# Patient Record
Sex: FEMALE | Race: BLACK OR AFRICAN AMERICAN | Employment: UNEMPLOYED | URBAN - METROPOLITAN AREA
[De-identification: names, ages, dates, MRNs, and addresses within clinical notes are randomized per-mention and may not be internally consistent; named-entity substitution may affect disease eponyms.]

---

## 2017-07-04 ENCOUNTER — HOSPITAL ENCOUNTER (EMERGENCY)
Age: 62
Discharge: HOME | End: 2017-07-04
Attending: EMERGENCY MEDICINE
Payer: COMMERCIAL

## 2017-07-04 ENCOUNTER — APPOINTMENT (OUTPATIENT)
Dept: GENERAL RADIOLOGY | Age: 62
End: 2017-07-04
Attending: EMERGENCY MEDICINE
Payer: COMMERCIAL

## 2017-07-04 VITALS
SYSTOLIC BLOOD PRESSURE: 177 MMHG | WEIGHT: 165 LBS | HEIGHT: 63 IN | RESPIRATION RATE: 16 BRPM | OXYGEN SATURATION: 100 % | DIASTOLIC BLOOD PRESSURE: 81 MMHG | BODY MASS INDEX: 29.23 KG/M2 | HEART RATE: 104 BPM | TEMPERATURE: 98.5 F

## 2017-07-04 DIAGNOSIS — S33.2XXA: Primary | ICD-10-CM

## 2017-07-04 PROCEDURE — 74011250637 HC RX REV CODE- 250/637: Performed by: EMERGENCY MEDICINE

## 2017-07-04 PROCEDURE — 99283 EMERGENCY DEPT VISIT LOW MDM: CPT | Performed by: EMERGENCY MEDICINE

## 2017-07-04 PROCEDURE — 72220 X-RAY EXAM SACRUM TAILBONE: CPT

## 2017-07-04 RX ORDER — TRAMADOL HYDROCHLORIDE 50 MG/1
50 TABLET ORAL
Status: COMPLETED | OUTPATIENT
Start: 2017-07-04 | End: 2017-07-04

## 2017-07-04 RX ORDER — TRAMADOL HYDROCHLORIDE 50 MG/1
50-100 TABLET ORAL
Qty: 19 TAB | Refills: 0 | Status: SHIPPED | OUTPATIENT
Start: 2017-07-04

## 2017-07-04 RX ADMIN — TRAMADOL HYDROCHLORIDE 50 MG: 50 TABLET, FILM COATED ORAL at 01:38

## 2017-07-04 NOTE — ED PROVIDER NOTES
Patient is a 57 y.o. female presenting with coccyx pain. The history is provided by the patient and the spouse. Tailbone Pain    This is a new problem. The current episode started 3 to 5 hours ago. The problem has been gradually worsening. The problem occurs constantly. Patient reports not work related injury. The pain is associated with a fall. The pain is present in the sacro-iliac joint. The quality of the pain is described as aching and dull. The pain does not radiate. The pain is moderate. The symptoms are aggravated by bending and certain positions. The pain is the same all the time. Pertinent negatives include no chest pain, no fever, no headaches, no abdominal pain, no bowel incontinence, no dysuria, no pelvic pain, no leg pain, no paresthesias, no paresis, no tingling and no weakness. She has tried nothing for the symptoms. Risk factors include obesity. The patient's surgical history non-contributory        Past Medical History:   Diagnosis Date    Dementia     Hypertension     Ill-defined condition     high cholerterol    Psychiatric disorder     anxiety       No past surgical history on file. No family history on file. Social History     Social History    Marital status:      Spouse name: N/A    Number of children: N/A    Years of education: N/A     Occupational History    Not on file. Social History Main Topics    Smoking status: Current Some Day Smoker    Smokeless tobacco: Not on file    Alcohol use No    Drug use: Not on file    Sexual activity: Not on file     Other Topics Concern    Not on file     Social History Narrative    No narrative on file         ALLERGIES: Review of patient's allergies indicates no known allergies. Review of Systems   Constitutional: Negative for chills and fever. HENT: Negative for congestion, ear pain and rhinorrhea. Eyes: Negative for photophobia and discharge. Respiratory: Negative for cough and shortness of breath. Cardiovascular: Negative for chest pain and palpitations. Gastrointestinal: Negative for abdominal pain, bowel incontinence, constipation, diarrhea and vomiting. Endocrine: Negative for cold intolerance and heat intolerance. Genitourinary: Negative for dysuria, flank pain and pelvic pain. Musculoskeletal: Positive for arthralgias and back pain. Negative for myalgias and neck pain. Skin: Negative for rash and wound. Allergic/Immunologic: Negative for environmental allergies and food allergies. Neurological: Negative for tingling, syncope, weakness, headaches and paresthesias. Hematological: Negative for adenopathy. Does not bruise/bleed easily. Psychiatric/Behavioral: Negative for dysphoric mood. The patient is not nervous/anxious. All other systems reviewed and are negative. Vitals:    07/04/17 0025 07/04/17 0144   BP: (!) 195/93 177/81   Pulse: 90 (!) 104   Resp: 20 16   Temp: 98.3 °F (36.8 °C) 98.5 °F (36.9 °C)   SpO2: 99% 100%   Weight: 74.8 kg (165 lb)    Height: 5' 3\" (1.6 m)             Physical Exam   Constitutional: She is oriented to person, place, and time. She appears well-developed and well-nourished. She appears distressed. HENT:   Head: Normocephalic and atraumatic. Mouth/Throat: Oropharynx is clear and moist.   Eyes: Conjunctivae and EOM are normal. Pupils are equal, round, and reactive to light. Right eye exhibits no discharge. Left eye exhibits no discharge. No scleral icterus. Neck: Normal range of motion. Neck supple. No thyromegaly present. Cardiovascular: Normal rate, regular rhythm, normal heart sounds and intact distal pulses. Exam reveals no gallop and no friction rub. No murmur heard. Pulmonary/Chest: Effort normal and breath sounds normal. No respiratory distress. She has no wheezes. She exhibits no tenderness. Abdominal: Soft. Bowel sounds are normal. She exhibits no distension. There is no hepatosplenomegaly. There is no tenderness.  There is no rebound and no guarding. No hernia. Musculoskeletal: Normal range of motion. She exhibits no edema or tenderness. Back:    Neurological: She is alert and oriented to person, place, and time. No cranial nerve deficit. She exhibits normal muscle tone. Skin: Skin is warm. No rash noted. She is not diaphoretic. No erythema. Psychiatric: She has a normal mood and affect. Her behavior is normal. Judgment and thought content normal.   Nursing note and vitals reviewed. MDM  Number of Diagnoses or Management Options  Subluxation of coccyx, initial encounter: new and requires workup  Diagnosis management comments: X-ray does not reveal any acute fracture. Evidence of subluxation is noted by the radiologist, however. Amount and/or Complexity of Data Reviewed  Tests in the radiology section of CPT®: ordered and reviewed  Tests in the medicine section of CPT®: ordered and reviewed  Review and summarize past medical records: yes    Risk of Complications, Morbidity, and/or Mortality  Presenting problems: moderate  Diagnostic procedures: moderate  Management options: moderate  General comments: Elements of this note have been dictated via voice recognition software. Text and phrases may be limited by the accuracy of the software. The chart has been reviewed, but errors may still be present.       Patient Progress  Patient progress: improved    ED Course       Procedures

## 2020-10-19 ENCOUNTER — HOSPITAL ENCOUNTER (EMERGENCY)
Age: 65
Discharge: HOME | End: 2020-10-19
Attending: EMERGENCY MEDICINE
Payer: COMMERCIAL

## 2020-10-19 VITALS
TEMPERATURE: 98.5 F | HEIGHT: 63 IN | OXYGEN SATURATION: 100 % | HEART RATE: 78 BPM | DIASTOLIC BLOOD PRESSURE: 84 MMHG | WEIGHT: 155 LBS | RESPIRATION RATE: 18 BRPM | BODY MASS INDEX: 27.46 KG/M2 | SYSTOLIC BLOOD PRESSURE: 175 MMHG

## 2020-10-19 PROCEDURE — 75810000275 HC EMERGENCY DEPT VISIT NO LEVEL OF CARE

## 2020-10-19 RX ORDER — BENAZEPRIL HYDROCHLORIDE 10 MG/1
10 TABLET ORAL DAILY
COMMUNITY

## 2020-10-19 NOTE — ED TRIAGE NOTES
Patient complain of hemorrhoids and she said that it is hurting too much [place].  Patient was masked on arrival.

## 2023-06-15 ENCOUNTER — APPOINTMENT (OUTPATIENT)
Dept: CT IMAGING | Age: 68
End: 2023-06-15
Attending: INTERNAL MEDICINE
Payer: MEDICARE

## 2023-06-15 ENCOUNTER — HOSPITAL ENCOUNTER (OUTPATIENT)
Age: 68
Setting detail: OUTPATIENT SURGERY
Discharge: HOME | End: 2023-06-15
Attending: INTERNAL MEDICINE
Payer: MEDICARE

## 2023-06-15 VITALS
HEART RATE: 104 BPM | TEMPERATURE: 98.3 F | OXYGEN SATURATION: 100 % | SYSTOLIC BLOOD PRESSURE: 114 MMHG | RESPIRATION RATE: 16 BRPM | DIASTOLIC BLOOD PRESSURE: 55 MMHG

## 2023-06-15 PROBLEM — J90 PLEURAL EFFUSION ON RIGHT: Status: ACTIVE | Noted: 2023-06-15

## 2023-06-15 PROBLEM — I10 HTN (HYPERTENSION): Chronic | Status: ACTIVE | Noted: 2023-06-15

## 2023-06-15 PROBLEM — E11.51 DIABETES TYPE 2 WITH ATHEROSCLEROSIS OF ARTERIES OF EXTREMITIES (HCC): Chronic | Status: ACTIVE | Noted: 2023-06-15

## 2023-06-15 PROBLEM — E11.9 DIABETES (HCC): Status: ACTIVE | Noted: 2023-06-15

## 2023-06-15 PROBLEM — I70.209 DIABETES TYPE 2 WITH ATHEROSCLEROSIS OF ARTERIES OF EXTREMITIES (HCC): Chronic | Status: ACTIVE | Noted: 2023-06-15

## 2023-06-15 PROBLEM — R91.8 MASS OF LEFT LUNG: Status: ACTIVE | Noted: 2023-06-15

## 2023-06-15 PROBLEM — Z72.0 TOBACCO USE: Chronic | Status: ACTIVE | Noted: 2023-06-15

## 2023-06-15 PROBLEM — Z85.3 HX: BREAST CANCER: Chronic | Status: ACTIVE | Noted: 2023-06-15

## 2023-06-15 PROBLEM — Z85.42 HISTORY OF ENDOMETRIAL CANCER: Chronic | Status: ACTIVE | Noted: 2023-06-15

## 2023-06-15 PROBLEM — E78.00 HYPERCHOLESTEROLEMIA: Status: ACTIVE | Noted: 2023-06-15

## 2023-06-15 LAB
APPEARANCE FLD: NORMAL
COLOR FLD: YELLOW
GLUCOSE FLD-MCNC: 392 MG/DL
LDH FLD L TO P-CCNC: 339 U/L
LYMPHOCYTES NFR BRONCH MANUAL: 92 %
NEUTROPHILS NFR BRONCH MANUAL: 8 %
NUC CELL # FLD: 294 /CU MM
PROT FLD-MCNC: 5.5 G/DL
RBC # FLD: 1000 /CU MM
SPECIMEN SOURCE FLD: NORMAL

## 2023-06-15 PROCEDURE — 84157 ASSAY OF PROTEIN OTHER: CPT

## 2023-06-15 PROCEDURE — 87116 MYCOBACTERIA CULTURE: CPT

## 2023-06-15 PROCEDURE — 87102 FUNGUS ISOLATION CULTURE: CPT

## 2023-06-15 PROCEDURE — 99214 OFFICE O/P EST MOD 30 MIN: CPT | Performed by: NURSE PRACTITIONER

## 2023-06-15 PROCEDURE — 87205 SMEAR GRAM STAIN: CPT

## 2023-06-15 PROCEDURE — C1729 CATH, DRAINAGE: HCPCS | Performed by: INTERNAL MEDICINE

## 2023-06-15 PROCEDURE — 32554 ASPIRATE PLEURA W/O IMAGING: CPT | Performed by: INTERNAL MEDICINE

## 2023-06-15 PROCEDURE — 71250 CT THORAX DX C-: CPT

## 2023-06-15 PROCEDURE — 84311 SPECTROPHOTOMETRY: CPT

## 2023-06-15 PROCEDURE — 2709999900 HC NON-CHARGEABLE SUPPLY: Performed by: INTERNAL MEDICINE

## 2023-06-15 PROCEDURE — 88305 TISSUE EXAM BY PATHOLOGIST: CPT

## 2023-06-15 PROCEDURE — 87070 CULTURE OTHR SPECIMN AEROBIC: CPT

## 2023-06-15 PROCEDURE — 89050 BODY FLUID CELL COUNT: CPT

## 2023-06-15 PROCEDURE — 32555 ASPIRATE PLEURA W/ IMAGING: CPT | Performed by: INTERNAL MEDICINE

## 2023-06-15 PROCEDURE — 87206 SMEAR FLUORESCENT/ACID STAI: CPT

## 2023-06-15 PROCEDURE — 88112 CYTOPATH CELL ENHANCE TECH: CPT

## 2023-06-15 PROCEDURE — 83615 LACTATE (LD) (LDH) ENZYME: CPT

## 2023-06-15 PROCEDURE — 82945 GLUCOSE OTHER FLUID: CPT

## 2023-06-15 ASSESSMENT — PAIN - FUNCTIONAL ASSESSMENT: PAIN_FUNCTIONAL_ASSESSMENT: NONE - DENIES PAIN

## 2023-06-17 LAB
BACTERIA SPEC CULT: NORMAL
GRAM STN SPEC: NORMAL
GRAM STN SPEC: NORMAL
SERVICE CMNT-IMP: NORMAL

## 2023-06-20 LAB
ADENOSINE DEAMINASE PLR-CCNC: 13 U/L (ref 0–30)
FUNGAL CULT/SMEAR: NORMAL
SPECIMEN PROCESSING: NORMAL
SPECIMEN SOURCE: NORMAL
SPECIMEN SOURCE: NORMAL

## 2023-06-21 LAB
FUNGUS SMEAR: NORMAL
SPECIMEN SOURCE: NORMAL

## 2023-06-26 LAB
ACID FAST STN SPEC: NEGATIVE
MYCOBACTERIUM SPEC QL CULT: NORMAL
SPECIMEN PREPARATION: NORMAL
SPECIMEN SOURCE: NORMAL

## 2023-07-05 ENCOUNTER — TELEPHONE (OUTPATIENT)
Dept: PULMONOLOGY | Age: 68
End: 2023-07-05

## 2023-07-07 ENCOUNTER — TELEPHONE (OUTPATIENT)
Dept: PULMONOLOGY | Age: 68
End: 2023-07-07

## 2023-07-17 LAB
FUNGAL CULT/SMEAR: NORMAL
FUNGUS (MYCOLOGY) CULTURE: NORMAL
FUNGUS SMEAR: NORMAL
REFLEX TO ID: NORMAL
SPECIMEN PROCESSING: NORMAL
SPECIMEN SOURCE: NORMAL
SPECIMEN SOURCE: NORMAL

## 2023-07-20 NOTE — TELEPHONE ENCOUNTER
Spoke directly to Dr. Stalin DOMINGO and he said patient needs to go back to [facility] due to previous cancer history and the pleural fluid did show Rare Malignancy Cells since he was only consulted for procedure and never seen her in the office. Spoke to patient and notified of results and she voices understanding.

## 2023-07-29 ENCOUNTER — HOSPITAL ENCOUNTER (EMERGENCY)
Age: 68
Discharge: HOME | End: 2023-07-29
Attending: EMERGENCY MEDICINE
Payer: MEDICARE

## 2023-07-29 ENCOUNTER — APPOINTMENT (OUTPATIENT)
Dept: GENERAL RADIOLOGY | Age: 68
End: 2023-07-29
Payer: MEDICARE

## 2023-07-29 VITALS
BODY MASS INDEX: 22.2 KG/M2 | DIASTOLIC BLOOD PRESSURE: 68 MMHG | RESPIRATION RATE: 20 BRPM | WEIGHT: 130 LBS | HEIGHT: 64 IN | HEART RATE: 110 BPM | OXYGEN SATURATION: 95 % | SYSTOLIC BLOOD PRESSURE: 116 MMHG | TEMPERATURE: 98.5 F

## 2023-07-29 DIAGNOSIS — M25.511 ACUTE PAIN OF RIGHT SHOULDER: Primary | ICD-10-CM

## 2023-07-29 PROCEDURE — 99283 EMERGENCY DEPT VISIT LOW MDM: CPT

## 2023-07-29 PROCEDURE — 73030 X-RAY EXAM OF SHOULDER: CPT

## 2023-07-29 PROCEDURE — 6370000000 HC RX 637 (ALT 250 FOR IP): Performed by: EMERGENCY MEDICINE

## 2023-07-29 RX ORDER — PREDNISONE 20 MG/1
40 TABLET ORAL ONCE
Status: COMPLETED | OUTPATIENT
Start: 2023-07-29 | End: 2023-07-29

## 2023-07-29 RX ORDER — PREDNISONE 20 MG/1
40 TABLET ORAL DAILY
Qty: 8 TABLET | Refills: 0 | Status: SHIPPED | OUTPATIENT
Start: 2023-07-30 | End: 2023-08-03

## 2023-07-29 RX ADMIN — PREDNISONE 40 MG: 20 TABLET ORAL at 20:17

## 2023-07-29 ASSESSMENT — PAIN DESCRIPTION - DESCRIPTORS: DESCRIPTORS: ACHING

## 2023-07-29 ASSESSMENT — PAIN DESCRIPTION - LOCATION: LOCATION: SHOULDER

## 2023-07-29 ASSESSMENT — LIFESTYLE VARIABLES: HOW MANY STANDARD DRINKS CONTAINING ALCOHOL DO YOU HAVE ON A TYPICAL DAY: PATIENT DOES NOT DRINK

## 2023-07-29 ASSESSMENT — PAIN SCALES - GENERAL: PAINLEVEL_OUTOF10: 6

## 2023-07-29 ASSESSMENT — PAIN - FUNCTIONAL ASSESSMENT: PAIN_FUNCTIONAL_ASSESSMENT: 0-10

## 2023-07-29 ASSESSMENT — PAIN DESCRIPTION - ORIENTATION: ORIENTATION: RIGHT

## 2023-07-30 NOTE — ED PROVIDER NOTES
Emergency Department Provider Note       PCP: Daniel Brar MD   Age: 77 y.o. Sex: female     DISPOSITION       No diagnosis found. Medical Decision Making     Complexity of Problems Addressed:  1 or more acute illnesses that pose a threat to life or bodily function. Data Reviewed and Analyzed:  Category 1:   I independently ordered and reviewed each unique test.  I reviewed external records: provider visit note from outside specialist.       Category 2:   I interpreted the X-rays no acute abnormality of the right shoulder. There is a residual right-sided pleural effusion which patient has had for a couple months. .    Category 3: Discussion of management or test interpretation. 75-year-old -American female presents with nontraumatic right shoulder pain. Pain is aggravated by movement. It is keeping her up at night. She knows of no injury. No fever. Of note she was recently diagnosed with suspected metastatic lung disease with right-sided pleural effusion requiring thoracentesis. She has been referred to oncology and has an appointment next week. No chest pain or shortness of breath at this time. No abdominal pain. Physical exam  Well-nourished -American female awake alert no distress. HEENT exam normocephalic atraumatic neck has no JVD. Cardiovascular regular rate and rhythm. Lungs mildly diminished on the right. No wheezing. Abdomen is soft nontender. Extremities no bruising swelling or deformity to right shoulder. There is some tenderness to the posterior aspect. Good range of motion and good distal function. ED course  X-ray shows no acute abnormality of the shoulder. There is a residual pleural effusion on the right. Patient is aware of the pleural effusion and has oncology follow-up scheduled. She was treated with prednisone in the emergency department. Will discharge home with prednisone for shoulder pain.       Risk of Complications and/or Morbidity of DOSEPACK) 4 MG TABLET    follow package directions    MOMETASONE (NASONEX) 50 MCG/ACT NASAL SPRAY    2 sprays by Nasal route daily    RANITIDINE (ZANTAC) 150 MG CAPSULE    Take 1 capsule by mouth    ROSUVASTATIN (CRESTOR) 5 MG TABLET    Take 1 tablet by mouth        Results for orders placed or performed during the hospital encounter of 07/29/23   XR SHOULDER RIGHT (MIN 2 VIEWS)    Narrative    EXAM: Right shoulder radiographs    DATE: July 29, 2023    HISTORY: Right shoulder pain    COMPARISON: None available    TECHNIQUE: 3 radiographs are obtained of the right shoulder    FINDINGS:    The osseous structures appear demineralized. The humeral head is well-seated   within the bony glenoid. There are mild degenerative changes in the right   shoulder. There is no acute fracture or dislocation. The right AC joint is   intact. Incidental note is made of a moderate or moderate to large right-sided   pleural effusion. This effusion could be loculated in the periphery of the right   apex. The mediastinum appears widened. Impression    1. No acute osseous abnormality. 2. Incidental note is made of a moderate to large right-sided pleural effusion,   possibly loculated. The need for a follow-up CT study of the thorax with   contrast for better characterization can be determined clinically. 3. The mediastinum appears widened. Again, a follow-up CT thorax with contrast   should be considered for better characterization. Cresencio Polanco M.D.   7/29/2023 8:11:00 PM        XR SHOULDER RIGHT (MIN 2 VIEWS)   Final Result   1. No acute osseous abnormality. 2. Incidental note is made of a moderate to large right-sided pleural effusion,    possibly loculated. The need for a follow-up CT study of the thorax with    contrast for better characterization can be determined clinically. 3. The mediastinum appears widened. Again, a follow-up CT thorax with contrast    should be considered for better characterization.        Jazzy Banks

## 2023-08-10 LAB
ACID FAST STN SPEC: NEGATIVE
MYCOBACTERIUM SPEC QL CULT: NEGATIVE
SPECIMEN PREPARATION: NORMAL
SPECIMEN SOURCE: NORMAL

## 2024-03-12 ENCOUNTER — APPOINTMENT (OUTPATIENT)
Dept: GENERAL RADIOLOGY | Age: 69
End: 2024-03-12
Payer: MEDICARE

## 2024-03-12 ENCOUNTER — HOSPITAL ENCOUNTER (EMERGENCY)
Age: 69
Discharge: ANOTHER HOSPITAL | End: 2024-03-12
Attending: EMERGENCY MEDICINE
Payer: MEDICARE

## 2024-03-12 ENCOUNTER — APPOINTMENT (OUTPATIENT)
Dept: ULTRASOUND IMAGING | Age: 69
End: 2024-03-12
Payer: MEDICARE

## 2024-03-12 ENCOUNTER — HOSPITAL ENCOUNTER (INPATIENT)
Age: 69
LOS: 1 days | Discharge: HOSPICE | DRG: 180 | End: 2024-03-13
Attending: STUDENT IN AN ORGANIZED HEALTH CARE EDUCATION/TRAINING PROGRAM | Admitting: FAMILY MEDICINE
Payer: MEDICARE

## 2024-03-12 ENCOUNTER — APPOINTMENT (OUTPATIENT)
Dept: CT IMAGING | Age: 69
End: 2024-03-12
Payer: MEDICARE

## 2024-03-12 VITALS
WEIGHT: 98 LBS | RESPIRATION RATE: 29 BRPM | BODY MASS INDEX: 16.73 KG/M2 | HEIGHT: 64 IN | TEMPERATURE: 98.4 F | HEART RATE: 148 BPM | DIASTOLIC BLOOD PRESSURE: 100 MMHG | SYSTOLIC BLOOD PRESSURE: 126 MMHG | OXYGEN SATURATION: 100 %

## 2024-03-12 DIAGNOSIS — J91.0 MALIGNANT PLEURAL EFFUSION: Primary | ICD-10-CM

## 2024-03-12 DIAGNOSIS — R06.89 DYSPNEA AND RESPIRATORY ABNORMALITIES: ICD-10-CM

## 2024-03-12 DIAGNOSIS — M84.522A PATHOLOGICAL FRACTURE OF LEFT HUMERUS DUE TO NEOPLASTIC DISEASE, INITIAL ENCOUNTER: ICD-10-CM

## 2024-03-12 DIAGNOSIS — R06.00 DYSPNEA AND RESPIRATORY ABNORMALITIES: ICD-10-CM

## 2024-03-12 DIAGNOSIS — R06.02 SHORTNESS OF BREATH: ICD-10-CM

## 2024-03-12 DIAGNOSIS — C54.1 MALIGNANT NEOPLASM OF ENDOMETRIUM (HCC): ICD-10-CM

## 2024-03-12 PROBLEM — C79.51 PAIN FROM BONE METASTASES (HCC): Status: ACTIVE | Noted: 2024-02-21

## 2024-03-12 PROBLEM — M22.42 CHONDROMALACIA OF LEFT PATELLA: Status: ACTIVE | Noted: 2017-10-04

## 2024-03-12 PROBLEM — G89.3 PAIN FROM BONE METASTASES (HCC): Status: ACTIVE | Noted: 2024-01-01

## 2024-03-12 PROBLEM — R00.0 SINUS TACHYCARDIA: Status: ACTIVE | Noted: 2024-03-12

## 2024-03-12 PROBLEM — D05.11 DUCTAL CARCINOMA IN SITU (DCIS) OF RIGHT BREAST: Chronic | Status: ACTIVE | Noted: 2019-03-20

## 2024-03-12 PROBLEM — I24.89 DEMAND ISCHEMIA (HCC): Status: ACTIVE | Noted: 2024-01-01

## 2024-03-12 PROBLEM — R91.8 LUNG NODULES: Status: ACTIVE | Noted: 2023-08-03

## 2024-03-12 PROBLEM — E86.0 DEHYDRATION: Status: ACTIVE | Noted: 2024-03-12

## 2024-03-12 LAB
ALBUMIN SERPL-MCNC: 3 G/DL (ref 3.2–4.6)
ALBUMIN/GLOB SERPL: 0.6 (ref 0.4–1.6)
ALP SERPL-CCNC: 131 U/L (ref 50–130)
ALT SERPL-CCNC: 14 U/L (ref 12–65)
ANION GAP SERPL CALC-SCNC: 8 MMOL/L (ref 2–11)
APPEARANCE UR: CLEAR
AST SERPL-CCNC: 48 U/L (ref 15–37)
BACTERIA URNS QL MICRO: NEGATIVE /HPF
BASOPHILS # BLD: 0 K/UL (ref 0–0.2)
BASOPHILS NFR BLD: 1 % (ref 0–2)
BILIRUB SERPL-MCNC: 0.6 MG/DL (ref 0.2–1.1)
BILIRUB UR QL: NEGATIVE
BUN SERPL-MCNC: 13 MG/DL (ref 8–23)
CALCIUM SERPL-MCNC: 9.9 MG/DL (ref 8.3–10.4)
CASTS URNS QL MICRO: ABNORMAL /LPF
CHLORIDE SERPL-SCNC: 95 MMOL/L (ref 103–113)
CO2 SERPL-SCNC: 35 MMOL/L (ref 21–32)
COLOR UR: ABNORMAL
CREAT SERPL-MCNC: 0.47 MG/DL (ref 0.6–1)
CRP SERPL-MCNC: 4.2 MG/DL (ref 0–0.9)
DIFFERENTIAL METHOD BLD: ABNORMAL
EKG ATRIAL RATE: 140 BPM
EKG DIAGNOSIS: NORMAL
EKG P AXIS: 31 DEGREES
EKG P-R INTERVAL: 96 MS
EKG Q-T INTERVAL: 350 MS
EKG QRS DURATION: 106 MS
EKG QTC CALCULATION (BAZETT): 535 MS
EKG R AXIS: -37 DEGREES
EKG T AXIS: 191 DEGREES
EKG VENTRICULAR RATE: 140 BPM
EOSINOPHIL # BLD: 0 K/UL (ref 0–0.8)
EOSINOPHIL NFR BLD: 0 % (ref 0.5–7.8)
EPI CELLS #/AREA URNS HPF: ABNORMAL /HPF
ERYTHROCYTE [DISTWIDTH] IN BLOOD BY AUTOMATED COUNT: 18.6 % (ref 11.9–14.6)
FLUAV RNA SPEC QL NAA+PROBE: NOT DETECTED
FLUBV RNA SPEC QL NAA+PROBE: NOT DETECTED
GLOBULIN SER CALC-MCNC: 4.9 G/DL (ref 2.8–4.5)
GLUCOSE SERPL-MCNC: 137 MG/DL (ref 65–100)
GLUCOSE UR STRIP.AUTO-MCNC: NEGATIVE MG/DL
HCT VFR BLD AUTO: 42.4 % (ref 35.8–46.3)
HGB BLD-MCNC: 13.3 G/DL (ref 11.7–15.4)
HGB UR QL STRIP: ABNORMAL
IMM GRANULOCYTES # BLD AUTO: 0 K/UL (ref 0–0.5)
IMM GRANULOCYTES NFR BLD AUTO: 0 % (ref 0–5)
KETONES UR QL STRIP.AUTO: 15 MG/DL
LACTATE SERPL-SCNC: 2 MMOL/L (ref 0.4–2)
LEUKOCYTE ESTERASE UR QL STRIP.AUTO: ABNORMAL
LYMPHOCYTES # BLD: 0.2 K/UL (ref 0.5–4.6)
LYMPHOCYTES NFR BLD: 7 % (ref 13–44)
MAGNESIUM SERPL-MCNC: 1.6 MG/DL (ref 1.8–2.4)
MCH RBC QN AUTO: 29.5 PG (ref 26.1–32.9)
MCHC RBC AUTO-ENTMCNC: 31.4 G/DL (ref 31.4–35)
MCV RBC AUTO: 94 FL (ref 82–102)
MONOCYTES # BLD: 0.6 K/UL (ref 0.1–1.3)
MONOCYTES NFR BLD: 17 % (ref 4–12)
NEUTS SEG # BLD: 2.5 K/UL (ref 1.7–8.2)
NEUTS SEG NFR BLD: 75 % (ref 43–78)
NITRITE UR QL STRIP.AUTO: NEGATIVE
NRBC # BLD: 0 K/UL (ref 0–0.2)
PH UR STRIP: 7 (ref 5–9)
PLATELET # BLD AUTO: 176 K/UL (ref 150–450)
PMV BLD AUTO: 9.3 FL (ref 9.4–12.3)
POTASSIUM SERPL-SCNC: 4.6 MMOL/L (ref 3.5–5.1)
PROCALCITONIN SERPL-MCNC: 0.1 NG/ML (ref 0–0.49)
PROT SERPL-MCNC: 7.9 G/DL (ref 6.3–8.2)
PROT UR STRIP-MCNC: ABNORMAL MG/DL
RBC # BLD AUTO: 4.51 M/UL (ref 4.05–5.2)
RBC #/AREA URNS HPF: ABNORMAL /HPF
SARS-COV-2 RDRP RESP QL NAA+PROBE: NOT DETECTED
SODIUM SERPL-SCNC: 138 MMOL/L (ref 136–146)
SOURCE: NORMAL
SP GR UR REFRACTOMETRY: >1.035 (ref 1–1.02)
T4 FREE SERPL-MCNC: 1 NG/DL (ref 0.78–1.46)
TROPONIN I SERPL HS-MCNC: 54.7 PG/ML (ref 0–14)
TROPONIN I SERPL HS-MCNC: 68.2 PG/ML (ref 0–14)
TSH W FREE THYROID IF ABNORMAL: 4.56 UIU/ML (ref 0.36–3.74)
UROBILINOGEN UR QL STRIP.AUTO: 1 EU/DL (ref 0.2–1)
WBC # BLD AUTO: 3.4 K/UL (ref 4.3–11.1)
WBC URNS QL MICRO: ABNORMAL /HPF

## 2024-03-12 PROCEDURE — 84439 ASSAY OF FREE THYROXINE: CPT

## 2024-03-12 PROCEDURE — 96365 THER/PROPH/DIAG IV INF INIT: CPT

## 2024-03-12 PROCEDURE — 96375 TX/PRO/DX INJ NEW DRUG ADDON: CPT

## 2024-03-12 PROCEDURE — 87502 INFLUENZA DNA AMP PROBE: CPT

## 2024-03-12 PROCEDURE — 94761 N-INVAS EAR/PLS OXIMETRY MLT: CPT

## 2024-03-12 PROCEDURE — 2400000000

## 2024-03-12 PROCEDURE — 6370000000 HC RX 637 (ALT 250 FOR IP): Performed by: EMERGENCY MEDICINE

## 2024-03-12 PROCEDURE — 87635 SARS-COV-2 COVID-19 AMP PRB: CPT

## 2024-03-12 PROCEDURE — 96366 THER/PROPH/DIAG IV INF ADDON: CPT

## 2024-03-12 PROCEDURE — 6360000004 HC RX CONTRAST MEDICATION: Performed by: EMERGENCY MEDICINE

## 2024-03-12 PROCEDURE — 84145 PROCALCITONIN (PCT): CPT

## 2024-03-12 PROCEDURE — 96360 HYDRATION IV INFUSION INIT: CPT

## 2024-03-12 PROCEDURE — 2580000003 HC RX 258: Performed by: FAMILY MEDICINE

## 2024-03-12 PROCEDURE — 1100000003 HC PRIVATE W/ TELEMETRY

## 2024-03-12 PROCEDURE — 93005 ELECTROCARDIOGRAM TRACING: CPT | Performed by: EMERGENCY MEDICINE

## 2024-03-12 PROCEDURE — 86140 C-REACTIVE PROTEIN: CPT

## 2024-03-12 PROCEDURE — 2500000003 HC RX 250 WO HCPCS: Performed by: FAMILY MEDICINE

## 2024-03-12 PROCEDURE — 2700000000 HC OXYGEN THERAPY PER DAY

## 2024-03-12 PROCEDURE — 6360000002 HC RX W HCPCS: Performed by: STUDENT IN AN ORGANIZED HEALTH CARE EDUCATION/TRAINING PROGRAM

## 2024-03-12 PROCEDURE — 80053 COMPREHEN METABOLIC PANEL: CPT

## 2024-03-12 PROCEDURE — 2580000003 HC RX 258: Performed by: EMERGENCY MEDICINE

## 2024-03-12 PROCEDURE — 6360000002 HC RX W HCPCS: Performed by: EMERGENCY MEDICINE

## 2024-03-12 PROCEDURE — 94640 AIRWAY INHALATION TREATMENT: CPT

## 2024-03-12 PROCEDURE — 84484 ASSAY OF TROPONIN QUANT: CPT

## 2024-03-12 PROCEDURE — 87040 BLOOD CULTURE FOR BACTERIA: CPT

## 2024-03-12 PROCEDURE — 6360000002 HC RX W HCPCS: Performed by: FAMILY MEDICINE

## 2024-03-12 PROCEDURE — 93010 ELECTROCARDIOGRAM REPORT: CPT | Performed by: INTERNAL MEDICINE

## 2024-03-12 PROCEDURE — 71045 X-RAY EXAM CHEST 1 VIEW: CPT

## 2024-03-12 PROCEDURE — 94760 N-INVAS EAR/PLS OXIMETRY 1: CPT

## 2024-03-12 PROCEDURE — 83735 ASSAY OF MAGNESIUM: CPT

## 2024-03-12 PROCEDURE — 81001 URINALYSIS AUTO W/SCOPE: CPT

## 2024-03-12 PROCEDURE — 71260 CT THORAX DX C+: CPT

## 2024-03-12 PROCEDURE — 83605 ASSAY OF LACTIC ACID: CPT

## 2024-03-12 PROCEDURE — 96361 HYDRATE IV INFUSION ADD-ON: CPT

## 2024-03-12 PROCEDURE — 84443 ASSAY THYROID STIM HORMONE: CPT

## 2024-03-12 PROCEDURE — 99285 EMERGENCY DEPT VISIT HI MDM: CPT

## 2024-03-12 PROCEDURE — 85025 COMPLETE CBC W/AUTO DIFF WBC: CPT

## 2024-03-12 RX ORDER — ONDANSETRON 4 MG/1
4 TABLET, ORALLY DISINTEGRATING ORAL EVERY 8 HOURS PRN
Status: DISCONTINUED | OUTPATIENT
Start: 2024-03-12 | End: 2024-03-13 | Stop reason: HOSPADM

## 2024-03-12 RX ORDER — LEVALBUTEROL INHALATION SOLUTION 0.63 MG/3ML
0.63 SOLUTION RESPIRATORY (INHALATION) EVERY 6 HOURS
Status: DISCONTINUED | OUTPATIENT
Start: 2024-03-12 | End: 2024-03-12 | Stop reason: SDUPTHER

## 2024-03-12 RX ORDER — ACETAMINOPHEN 650 MG/1
650 SUPPOSITORY RECTAL EVERY 4 HOURS PRN
COMMUNITY

## 2024-03-12 RX ORDER — METOPROLOL TARTRATE 1 MG/ML
1.25 INJECTION, SOLUTION INTRAVENOUS ONCE
Status: DISCONTINUED | OUTPATIENT
Start: 2024-03-12 | End: 2024-03-12 | Stop reason: HOSPADM

## 2024-03-12 RX ORDER — DEXTROSE AND SODIUM CHLORIDE 5; .45 G/100ML; G/100ML
INJECTION, SOLUTION INTRAVENOUS CONTINUOUS
Status: DISCONTINUED | OUTPATIENT
Start: 2024-03-12 | End: 2024-03-13 | Stop reason: HOSPADM

## 2024-03-12 RX ORDER — POTASSIUM CHLORIDE 20 MEQ/1
40 TABLET, EXTENDED RELEASE ORAL PRN
Status: DISCONTINUED | OUTPATIENT
Start: 2024-03-12 | End: 2024-03-13 | Stop reason: HOSPADM

## 2024-03-12 RX ORDER — METOPROLOL TARTRATE 1 MG/ML
2.5 INJECTION, SOLUTION INTRAVENOUS EVERY 6 HOURS PRN
Status: DISCONTINUED | OUTPATIENT
Start: 2024-03-12 | End: 2024-03-13 | Stop reason: HOSPADM

## 2024-03-12 RX ORDER — OXYCODONE HYDROCHLORIDE 20 MG/1
30 TABLET ORAL EVERY 4 HOURS PRN
COMMUNITY
Start: 2024-03-11

## 2024-03-12 RX ORDER — DEXTROSE AND SODIUM CHLORIDE 5; .45 G/100ML; G/100ML
INJECTION, SOLUTION INTRAVENOUS CONTINUOUS
Status: DISCONTINUED | OUTPATIENT
Start: 2024-03-12 | End: 2024-03-12 | Stop reason: HOSPADM

## 2024-03-12 RX ORDER — ONDANSETRON 2 MG/ML
4 INJECTION INTRAMUSCULAR; INTRAVENOUS EVERY 6 HOURS PRN
Status: DISCONTINUED | OUTPATIENT
Start: 2024-03-12 | End: 2024-03-13 | Stop reason: HOSPADM

## 2024-03-12 RX ORDER — LANOLIN ALCOHOL/MO/W.PET/CERES
3 CREAM (GRAM) TOPICAL NIGHTLY PRN
Status: DISCONTINUED | OUTPATIENT
Start: 2024-03-12 | End: 2024-03-13 | Stop reason: HOSPADM

## 2024-03-12 RX ORDER — MORPHINE SULFATE 2 MG/ML
INJECTION, SOLUTION INTRAMUSCULAR; INTRAVENOUS
Status: DISCONTINUED
Start: 2024-03-12 | End: 2024-03-12

## 2024-03-12 RX ORDER — HYDROMORPHONE HYDROCHLORIDE 1 MG/ML
0.25 INJECTION, SOLUTION INTRAMUSCULAR; INTRAVENOUS; SUBCUTANEOUS EVERY 4 HOURS PRN
Status: DISCONTINUED | OUTPATIENT
Start: 2024-03-12 | End: 2024-03-13

## 2024-03-12 RX ORDER — SODIUM CHLORIDE 9 MG/ML
30 INJECTION, SOLUTION INTRAVENOUS ONCE
Status: COMPLETED | OUTPATIENT
Start: 2024-03-12 | End: 2024-03-12

## 2024-03-12 RX ORDER — HYDROMORPHONE HYDROCHLORIDE 1 MG/ML
0.5 INJECTION, SOLUTION INTRAMUSCULAR; INTRAVENOUS; SUBCUTANEOUS EVERY 4 HOURS PRN
Status: DISCONTINUED | OUTPATIENT
Start: 2024-03-12 | End: 2024-03-13 | Stop reason: SDUPTHER

## 2024-03-12 RX ORDER — DEXTROSE AND SODIUM CHLORIDE 5; .45 G/100ML; G/100ML
INJECTION, SOLUTION INTRAVENOUS CONTINUOUS
Status: DISCONTINUED | OUTPATIENT
Start: 2024-03-12 | End: 2024-03-12

## 2024-03-12 RX ORDER — ENOXAPARIN SODIUM 100 MG/ML
40 INJECTION SUBCUTANEOUS DAILY
Status: DISCONTINUED | OUTPATIENT
Start: 2024-03-14 | End: 2024-03-12

## 2024-03-12 RX ORDER — SODIUM CHLORIDE 0.9 % (FLUSH) 0.9 %
5-40 SYRINGE (ML) INJECTION PRN
Status: DISCONTINUED | OUTPATIENT
Start: 2024-03-12 | End: 2024-03-13 | Stop reason: HOSPADM

## 2024-03-12 RX ORDER — METOPROLOL TARTRATE 1 MG/ML
1.25 INJECTION, SOLUTION INTRAVENOUS EVERY 5 MIN PRN
Status: DISCONTINUED | OUTPATIENT
Start: 2024-03-12 | End: 2024-03-12 | Stop reason: HOSPADM

## 2024-03-12 RX ORDER — POTASSIUM CHLORIDE 7.45 MG/ML
10 INJECTION INTRAVENOUS PRN
Status: DISCONTINUED | OUTPATIENT
Start: 2024-03-12 | End: 2024-03-13 | Stop reason: HOSPADM

## 2024-03-12 RX ORDER — MAGNESIUM SULFATE IN WATER 40 MG/ML
2000 INJECTION, SOLUTION INTRAVENOUS PRN
Status: DISCONTINUED | OUTPATIENT
Start: 2024-03-12 | End: 2024-03-13 | Stop reason: HOSPADM

## 2024-03-12 RX ORDER — SODIUM CHLORIDE 0.9 % (FLUSH) 0.9 %
5-40 SYRINGE (ML) INJECTION EVERY 12 HOURS SCHEDULED
Status: DISCONTINUED | OUTPATIENT
Start: 2024-03-12 | End: 2024-03-13 | Stop reason: HOSPADM

## 2024-03-12 RX ORDER — ALBUTEROL SULFATE 2.5 MG/3ML
2.5 SOLUTION RESPIRATORY (INHALATION)
Status: DISCONTINUED | OUTPATIENT
Start: 2024-03-12 | End: 2024-03-13 | Stop reason: HOSPADM

## 2024-03-12 RX ORDER — ENOXAPARIN SODIUM 100 MG/ML
30 INJECTION SUBCUTANEOUS DAILY
Status: DISCONTINUED | OUTPATIENT
Start: 2024-03-14 | End: 2024-03-13 | Stop reason: HOSPADM

## 2024-03-12 RX ORDER — METHYLPREDNISOLONE SODIUM SUCCINATE 125 MG/2ML
125 INJECTION, POWDER, LYOPHILIZED, FOR SOLUTION INTRAMUSCULAR; INTRAVENOUS
Status: DISCONTINUED | OUTPATIENT
Start: 2024-03-12 | End: 2024-03-12 | Stop reason: SDUPTHER

## 2024-03-12 RX ORDER — OXYCODONE HYDROCHLORIDE 15 MG/1
30 TABLET ORAL
Status: COMPLETED | OUTPATIENT
Start: 2024-03-12 | End: 2024-03-12

## 2024-03-12 RX ORDER — ACETAMINOPHEN 325 MG/1
650 TABLET ORAL EVERY 6 HOURS PRN
Status: DISCONTINUED | OUTPATIENT
Start: 2024-03-12 | End: 2024-03-13 | Stop reason: HOSPADM

## 2024-03-12 RX ORDER — ACETAMINOPHEN 650 MG/1
650 SUPPOSITORY RECTAL EVERY 6 HOURS PRN
Status: DISCONTINUED | OUTPATIENT
Start: 2024-03-12 | End: 2024-03-13 | Stop reason: HOSPADM

## 2024-03-12 RX ORDER — SODIUM CHLORIDE 9 MG/ML
INJECTION, SOLUTION INTRAVENOUS PRN
Status: DISCONTINUED | OUTPATIENT
Start: 2024-03-12 | End: 2024-03-13 | Stop reason: HOSPADM

## 2024-03-12 RX ORDER — POLYETHYLENE GLYCOL 3350 17 G/17G
17 POWDER, FOR SOLUTION ORAL DAILY PRN
Status: DISCONTINUED | OUTPATIENT
Start: 2024-03-12 | End: 2024-03-13 | Stop reason: HOSPADM

## 2024-03-12 RX ORDER — IPRATROPIUM BROMIDE AND ALBUTEROL SULFATE 2.5; .5 MG/3ML; MG/3ML
1 SOLUTION RESPIRATORY (INHALATION)
Status: COMPLETED | OUTPATIENT
Start: 2024-03-12 | End: 2024-03-12

## 2024-03-12 RX ORDER — ARFORMOTEROL TARTRATE 15 UG/2ML
15 SOLUTION RESPIRATORY (INHALATION)
Status: DISCONTINUED | OUTPATIENT
Start: 2024-03-12 | End: 2024-03-13 | Stop reason: HOSPADM

## 2024-03-12 RX ADMIN — HYDROMORPHONE HYDROCHLORIDE 0.25 MG: 1 INJECTION, SOLUTION INTRAMUSCULAR; INTRAVENOUS; SUBCUTANEOUS at 21:36

## 2024-03-12 RX ADMIN — IOPAMIDOL 100 ML: 755 INJECTION, SOLUTION INTRAVENOUS at 13:06

## 2024-03-12 RX ADMIN — OXYCODONE HYDROCHLORIDE 30 MG: 15 TABLET ORAL at 14:58

## 2024-03-12 RX ADMIN — ONDANSETRON 4 MG: 2 INJECTION INTRAMUSCULAR; INTRAVENOUS at 17:25

## 2024-03-12 RX ADMIN — ALBUTEROL SULFATE 2.5 MG: 2.5 SOLUTION RESPIRATORY (INHALATION) at 17:06

## 2024-03-12 RX ADMIN — DEXTROSE AND SODIUM CHLORIDE: 5; 450 INJECTION, SOLUTION INTRAVENOUS at 17:47

## 2024-03-12 RX ADMIN — AZITHROMYCIN MONOHYDRATE 500 MG: 500 INJECTION, POWDER, LYOPHILIZED, FOR SOLUTION INTRAVENOUS at 14:15

## 2024-03-12 RX ADMIN — ALBUTEROL SULFATE 2.5 MG: 2.5 SOLUTION RESPIRATORY (INHALATION) at 20:42

## 2024-03-12 RX ADMIN — SODIUM CHLORIDE 30 ML/KG/HR: 9 INJECTION, SOLUTION INTRAVENOUS at 12:54

## 2024-03-12 RX ADMIN — METHYLPREDNISOLONE SODIUM SUCCINATE 125 MG: 125 INJECTION INTRAMUSCULAR; INTRAVENOUS at 14:05

## 2024-03-12 RX ADMIN — HYDROMORPHONE HYDROCHLORIDE 0.25 MG: 1 INJECTION, SOLUTION INTRAMUSCULAR; INTRAVENOUS; SUBCUTANEOUS at 17:25

## 2024-03-12 RX ADMIN — ARFORMOTEROL TARTRATE 15 MCG: 15 SOLUTION RESPIRATORY (INHALATION) at 20:43

## 2024-03-12 RX ADMIN — CEFTRIAXONE 1000 MG: 1 INJECTION, POWDER, FOR SOLUTION INTRAMUSCULAR; INTRAVENOUS at 14:08

## 2024-03-12 RX ADMIN — IPRATROPIUM BROMIDE AND ALBUTEROL SULFATE 1 DOSE: .5; 3 SOLUTION RESPIRATORY (INHALATION) at 12:19

## 2024-03-12 ASSESSMENT — LIFESTYLE VARIABLES
HOW MANY STANDARD DRINKS CONTAINING ALCOHOL DO YOU HAVE ON A TYPICAL DAY: PATIENT DOES NOT DRINK
HOW OFTEN DO YOU HAVE A DRINK CONTAINING ALCOHOL: NEVER

## 2024-03-12 ASSESSMENT — ENCOUNTER SYMPTOMS
DIARRHEA: 0
COUGH: 0
BACK PAIN: 0
EYE PAIN: 0
TROUBLE SWALLOWING: 0
SINUS PAIN: 0
SHORTNESS OF BREATH: 1
NAUSEA: 1
CONSTIPATION: 0
EYE REDNESS: 0
SPUTUM PRODUCTION: 0
WHEEZING: 1
VOMITING: 0
EYE ITCHING: 0
ABDOMINAL PAIN: 0

## 2024-03-12 ASSESSMENT — PAIN DESCRIPTION - ORIENTATION: ORIENTATION: POSTERIOR

## 2024-03-12 ASSESSMENT — PAIN DESCRIPTION - DESCRIPTORS: DESCRIPTORS: ACHING;BURNING;SORE

## 2024-03-12 ASSESSMENT — PAIN DESCRIPTION - ONSET: ONSET: ON-GOING

## 2024-03-12 ASSESSMENT — PAIN DESCRIPTION - LOCATION: LOCATION: SACRUM

## 2024-03-12 ASSESSMENT — PAIN - FUNCTIONAL ASSESSMENT: PAIN_FUNCTIONAL_ASSESSMENT: PREVENTS OR INTERFERES WITH ALL ACTIVE AND SOME PASSIVE ACTIVITIES

## 2024-03-12 ASSESSMENT — PAIN SCALES - GENERAL
PAINLEVEL_OUTOF10: 10
PAINLEVEL_OUTOF10: 4
PAINLEVEL_OUTOF10: 0
PAINLEVEL_OUTOF10: 6

## 2024-03-12 ASSESSMENT — PAIN DESCRIPTION - PAIN TYPE: TYPE: CHRONIC PAIN

## 2024-03-12 ASSESSMENT — PAIN DESCRIPTION - FREQUENCY: FREQUENCY: CONTINUOUS

## 2024-03-12 NOTE — ED PROVIDER NOTES
Emergency Department Provider Note       PCP: Hoenicke, David, MD   Age: 69 y.o.   Sex: female     DISPOSITION Decision To Transfer 03/12/2024 02:26:49 PM       ICD-10-CM    1. Malignant pleural effusion  J91.0       2. Dyspnea and respiratory abnormalities  R06.00     R06.89       3. Malignant neoplasm of endometrium (HCC)  C54.1       4. Pathological fracture of left humerus due to neoplastic disease, initial encounter  M84.522A           Medical Decision Making     69-year-old female patient with a known history of malignant endometrial cancer presents due to progressive shortness of breath despite her normal oxygen  History of prior tobacco use as well  Workup today reveals of a growing and recurrent right pleural effusion.  CT PE protocol negative for pulmonary embolism  Radiology did note multiple new lung nodules as well as the presence of a known left pathologic humeral head fracture    Patient remains a full code and after discussion with patient and family they are interested in having thoracentesis for symptomatic improvement  I will consult the hospitalist service to initiate transfer to the [facility] for admission and interventional radiology evaluation     1 or more acute illnesses that pose a threat to life or bodily function.   1 or more chronic illnesses with a severe exacerbation or progression.  Discussion with external consultants.  Chronic medical problems impacting care include metastatic endometrial cancer.  Shared medical decision making was utilized in creating the patients health plan today.    I independently ordered and reviewed each unique test.  I reviewed external records: ED visit note from an outside group.  I reviewed external records: provider visit note from PCP.  I reviewed external records: provider visit note from outside specialist.   The patients assessment required an independent historian: Family at bedside.  The reason they were needed is important historical

## 2024-03-12 NOTE — H&P
chronic malignant pleural effusion, chronic respiratory failure (3 L) who presented to the ER by family with complaints of shortness of breath, fatigue, and minimal oral intake.  Shortness of breath was sudden in onset occurring over the past day.  Vital signs are remarkable for tachypnea and sinus tachycardia.  Oxygen requirements remained stable on her baseline requirements.  CT PE is negative for pulmonary embolism however there is an increase in solid and cavitary pulmonary metastasis, new and diffuse nodular pleural thickening, expansive destruction of the proximal humerus consistent with a pathologic fracture and new lytic osseous metastasis to the right ribs and mid thoracic vertebral bodies.  Her lab work is fairly unremarkable with the exception of troponinemia from her dehydration.  Her clinical presentation and radiographic findings are revealing that her stage IV metastatic cancer has now progressed and unfortunately she is dying.  Goals of care discussion as outlined in ACP note.  Discussed with this degree of frailty for what treatment options she does have left, such as palliative radiation, they would need to decide if she would rather spend the remaining length of her life in the healthcare system which she rather be at home with loved ones.  Both the son and  feel as though she would want to be at home.  For now however they would like to address his pleural effusion to see if this will help with her shortness of breath while discussing with the others regarding transitioning to hospice care.  They would like her to be a DNR for now.    Shortness of breath 2/2 acute on chronic malignant pleural effusion, R/o PNA  Chronic respiratory failure on 3 L  Stage IV Metastatic CA, endometrial cancer  Sinus tachycardia & demand ischemia 2/2 Dehydration  Chronic Encephalopathy 2/2 above  Severe protein calorie malnutrition    Plan  Continue IV Rocephin daily until transition to complete comfort

## 2024-03-12 NOTE — PROGRESS NOTES
Just discussed goals of care with  and son at bedside. After our discussion they desire DNR and not full code. See upcoming notes. Current plan is to transfer to [facility].

## 2024-03-12 NOTE — ACP (ADVANCE CARE PLANNING)
[facility] Hospitalist Service  NON-BILLABLE  At the heart of better care     Advance Care Planning   Admit Date:  3/12/2024 11:33 AM   Name:  Shadia Maciel   Age:  69 y.o.  Sex:  female  :  1955   MRN:  [id number]   Room:  [place] [address]    Shadia Maciel has capacity to make her own decisions:   No    If pt unable to make decisions, POA/surrogate decision maker:  Spouse    Other people present:   Son    Patient / surrogate decision-maker directed code status:  DNR/DNI    Other ACP topics discussed, if applicable:   Discussed possibility of hospice or comfort measures.   Discussed with both the  and son regarding radiographic findings of progressive metastatic cancer, frailty, and that my concern is this cancer has progressed to far and unfortunately when progressed to this extent our treatment options are limited.  Discussed my concerns that if event was to occur where her heart did stop beating, cancer would still be present.  Discussed in the event her heart was to stop beating my concern is that she would be at higher risk to have suffering then benefit from these interventions.  Additionally, we discussed with cancer having spread to this extent her weight loss, decreased activities, decreased mentation are signs that she is dying and the time left that she has is limited.  I discussed that I know the family mentioned initially wanting to fight this disease however when the cancer is spread to this extent and they have lost this much weight they typically cannot tolerate the treatments.  For which treatment options she does have such as palliative radiation they would have to decide if she would want to spend the remaining length of her life in the healthcare system receiving imaging, and lab work and vital signs are where they rather be at home with their families and loved ones.  Both the son and the  feel as though she would want to be at home with her loved ones and not spend the

## 2024-03-12 NOTE — ED NOTES
TRANSFER - OUT REPORT:    Verbal report given to FARIDEH Allen on Shadia Maciel  being transferred to [facility] Fifth floor 502 for routine progression of patient care       Report consisted of patient's Situation, Background, Assessment and   Recommendations(SBAR).     Information from the following report(s) ED SBAR was reviewed with the receiving nurse.    Lines:   Peripheral IV 03/12/24 Right Antecubital (Active)   Site Assessment Clean, dry & intact 03/12/24 1205   Line Status Blood return noted 03/12/24 1205   Phlebitis Assessment No symptoms 03/12/24 1205   Infiltration Assessment 0 03/12/24 1205       Peripheral IV Right;Dorsal Forearm (Active)        Opportunity for questions and clarification was provided.      Patient transported with:  [facility]

## 2024-03-12 NOTE — PRE-PROCEDURE INSTRUCTIONS
Interventional Radiology Inpatient Communication       Interventional Radiology has received a consult for Thoracentesis.       Any diagnostic labs to be performed on collected specimen must be entered into [facility] prior to transport to Interventional Radiology.      We anticipate this procedure will be completed on 03/13/2024.                 Primary Care Nurse:  FARIDEH Allen        Please ensure the following is completed:     Patient is NPO: No    Blood thinners held: Yes    Patient must have working IV: Yes       Patient must be in a hospital gown with snaps and be transported via stretcher to Interventional Radiology. Please send hospital chart and labels.     If the patient is unable to provide consent for the procedure, please contact Interventional Radiology at [phone].

## 2024-03-12 NOTE — PROGRESS NOTES
1540: TRANSFER - IN REPORT:    Verbal report received from Laura GONG  on Shadia Maciel  being received from [facility]  for routine progression of patient care      Report consisted of patient's Situation, Background, Assessment and   Recommendations(SBAR).     Information from the following report(s) Nurse Handoff Report, Intake/Output, MAR, and Recent Results was reviewed with the receiving nurse.    Opportunity for questions and clarification was provided.      Assessment completed upon patient's arrival to unit and care assumed.     1800: Pt to be NPO at midnight for thora in the a.m. Hold any Lovenox before procedure.

## 2024-03-12 NOTE — ED NOTES
Family denies DNR and states they want her to be a Full Code. Dr Robertson notified of family's request.

## 2024-03-12 NOTE — ED TRIAGE NOTES
Patient  states patient is a cancer patient. Patient  states patient shortness of breath began this morning. Patient  notes increase in cough    Patient wears 3L Ns at all times.

## 2024-03-13 ENCOUNTER — APPOINTMENT (OUTPATIENT)
Dept: INTERVENTIONAL RADIOLOGY/VASCULAR | Age: 69
DRG: 180 | End: 2024-03-13
Attending: FAMILY MEDICINE
Payer: MEDICARE

## 2024-03-13 VITALS
DIASTOLIC BLOOD PRESSURE: 73 MMHG | HEIGHT: 64 IN | OXYGEN SATURATION: 100 % | HEART RATE: 116 BPM | WEIGHT: 101 LBS | TEMPERATURE: 97.3 F | RESPIRATION RATE: 26 BRPM | SYSTOLIC BLOOD PRESSURE: 122 MMHG | BODY MASS INDEX: 17.24 KG/M2

## 2024-03-13 PROBLEM — E83.42 HYPOMAGNESEMIA: Status: ACTIVE | Noted: 2024-01-01

## 2024-03-13 PROBLEM — E87.6 HYPOKALEMIA: Status: ACTIVE | Noted: 2024-03-13

## 2024-03-13 PROBLEM — Z51.5 HOSPICE CARE: Status: ACTIVE | Noted: 2024-01-01

## 2024-03-13 PROBLEM — E83.39 HYPOPHOSPHATEMIA: Status: ACTIVE | Noted: 2024-03-13

## 2024-03-13 LAB
ALBUMIN SERPL-MCNC: 2.6 G/DL (ref 3.2–4.6)
ALBUMIN/GLOB SERPL: 0.6 (ref 0.4–1.6)
ALP SERPL-CCNC: 120 U/L (ref 50–136)
ALT SERPL-CCNC: 14 U/L (ref 12–65)
ANION GAP SERPL CALC-SCNC: 6 MMOL/L (ref 2–11)
AST SERPL-CCNC: 39 U/L (ref 15–37)
BASOPHILS # BLD: 0 K/UL (ref 0–0.2)
BASOPHILS NFR BLD: 0 % (ref 0–2)
BILIRUB SERPL-MCNC: 0.5 MG/DL (ref 0.2–1.1)
BUN SERPL-MCNC: 6 MG/DL (ref 8–23)
CALCIUM SERPL-MCNC: 9.2 MG/DL (ref 8.3–10.4)
CHLORIDE SERPL-SCNC: 96 MMOL/L (ref 103–113)
CO2 SERPL-SCNC: 36 MMOL/L (ref 21–32)
CREAT SERPL-MCNC: 0.3 MG/DL (ref 0.6–1)
DIFFERENTIAL METHOD BLD: ABNORMAL
EOSINOPHIL # BLD: 0 K/UL (ref 0–0.8)
EOSINOPHIL NFR BLD: 0 % (ref 0.5–7.8)
ERYTHROCYTE [DISTWIDTH] IN BLOOD BY AUTOMATED COUNT: 18.3 % (ref 11.9–14.6)
GLOBULIN SER CALC-MCNC: 4.1 G/DL (ref 2.8–4.5)
GLUCOSE SERPL-MCNC: 165 MG/DL (ref 65–100)
HCT VFR BLD AUTO: 35.3 % (ref 35.8–46.3)
HGB BLD-MCNC: 11.4 G/DL (ref 11.7–15.4)
IMM GRANULOCYTES # BLD AUTO: 0 K/UL (ref 0–0.5)
IMM GRANULOCYTES NFR BLD AUTO: 0 % (ref 0–5)
LYMPHOCYTES # BLD: 0.2 K/UL (ref 0.5–4.6)
LYMPHOCYTES NFR BLD: 5 % (ref 13–44)
MAGNESIUM SERPL-MCNC: 1.4 MG/DL (ref 1.8–2.4)
MCH RBC QN AUTO: 29.2 PG (ref 26.1–32.9)
MCHC RBC AUTO-ENTMCNC: 32.3 G/DL (ref 31.4–35)
MCV RBC AUTO: 90.5 FL (ref 82–102)
MONOCYTES # BLD: 0.6 K/UL (ref 0.1–1.3)
MONOCYTES NFR BLD: 15 % (ref 4–12)
NEUTS SEG # BLD: 3.2 K/UL (ref 1.7–8.2)
NEUTS SEG NFR BLD: 80 % (ref 43–78)
NRBC # BLD: 0 K/UL (ref 0–0.2)
PHOSPHATE SERPL-MCNC: 1.3 MG/DL (ref 2.3–3.7)
PLATELET # BLD AUTO: 142 K/UL (ref 150–450)
PMV BLD AUTO: 9 FL (ref 9.4–12.3)
POTASSIUM SERPL-SCNC: 3.1 MMOL/L (ref 3.5–5.1)
PROT SERPL-MCNC: 6.7 G/DL (ref 6.3–8.2)
RBC # BLD AUTO: 3.9 M/UL (ref 4.05–5.2)
SODIUM SERPL-SCNC: 138 MMOL/L (ref 136–146)
WBC # BLD AUTO: 4 K/UL (ref 4.3–11.1)

## 2024-03-13 PROCEDURE — 36591 DRAW BLOOD OFF VENOUS DEVICE: CPT

## 2024-03-13 PROCEDURE — 83735 ASSAY OF MAGNESIUM: CPT

## 2024-03-13 PROCEDURE — 2700000000 HC OXYGEN THERAPY PER DAY

## 2024-03-13 PROCEDURE — 76604 US EXAM CHEST: CPT

## 2024-03-13 PROCEDURE — 85025 COMPLETE CBC W/AUTO DIFF WBC: CPT

## 2024-03-13 PROCEDURE — 94640 AIRWAY INHALATION TREATMENT: CPT

## 2024-03-13 PROCEDURE — 2580000003 HC RX 258: Performed by: FAMILY MEDICINE

## 2024-03-13 PROCEDURE — 84100 ASSAY OF PHOSPHORUS: CPT

## 2024-03-13 PROCEDURE — 2500000003 HC RX 250 WO HCPCS: Performed by: NURSE PRACTITIONER

## 2024-03-13 PROCEDURE — 6360000002 HC RX W HCPCS: Performed by: FAMILY MEDICINE

## 2024-03-13 PROCEDURE — 2500000003 HC RX 250 WO HCPCS: Performed by: FAMILY MEDICINE

## 2024-03-13 PROCEDURE — 94761 N-INVAS EAR/PLS OXIMETRY MLT: CPT

## 2024-03-13 PROCEDURE — 6360000002 HC RX W HCPCS: Performed by: STUDENT IN AN ORGANIZED HEALTH CARE EDUCATION/TRAINING PROGRAM

## 2024-03-13 PROCEDURE — 80053 COMPREHEN METABOLIC PANEL: CPT

## 2024-03-13 PROCEDURE — 76604 US EXAM CHEST: CPT | Performed by: RADIOLOGY

## 2024-03-13 RX ORDER — OXYCODONE HYDROCHLORIDE 15 MG/1
30 TABLET ORAL EVERY 4 HOURS PRN
Status: DISCONTINUED | OUTPATIENT
Start: 2024-03-13 | End: 2024-03-13 | Stop reason: HOSPADM

## 2024-03-13 RX ORDER — POTASSIUM PHOSPHATE, MONOBASIC 500 MG/1
500 TABLET, SOLUBLE ORAL 2 TIMES DAILY
Qty: 6 TABLET | Refills: 0 | Status: SHIPPED | OUTPATIENT
Start: 2024-03-13 | End: 2024-03-16

## 2024-03-13 RX ORDER — MAGNESIUM SULFATE IN WATER 40 MG/ML
2000 INJECTION, SOLUTION INTRAVENOUS ONCE
Status: COMPLETED | OUTPATIENT
Start: 2024-03-13 | End: 2024-03-13

## 2024-03-13 RX ORDER — IPRATROPIUM BROMIDE AND ALBUTEROL SULFATE 2.5; .5 MG/3ML; MG/3ML
3 SOLUTION RESPIRATORY (INHALATION) EVERY 4 HOURS PRN
Qty: 360 ML | Refills: 0 | Status: SHIPPED | OUTPATIENT
Start: 2024-03-13

## 2024-03-13 RX ORDER — NALOXONE HYDROCHLORIDE 0.4 MG/ML
0.4 INJECTION, SOLUTION INTRAMUSCULAR; INTRAVENOUS; SUBCUTANEOUS PRN
Status: DISCONTINUED | OUTPATIENT
Start: 2024-03-13 | End: 2024-03-13 | Stop reason: HOSPADM

## 2024-03-13 RX ORDER — HYDROMORPHONE HYDROCHLORIDE 1 MG/ML
1 INJECTION, SOLUTION INTRAMUSCULAR; INTRAVENOUS; SUBCUTANEOUS EVERY 4 HOURS PRN
Status: DISCONTINUED | OUTPATIENT
Start: 2024-03-13 | End: 2024-03-13 | Stop reason: HOSPADM

## 2024-03-13 RX ORDER — NALOXONE HYDROCHLORIDE 4 MG/.1ML
1 SPRAY NASAL PRN
Qty: 1 EACH | Refills: 0 | Status: SHIPPED | OUTPATIENT
Start: 2024-03-13

## 2024-03-13 RX ADMIN — ALBUTEROL SULFATE 2.5 MG: 2.5 SOLUTION RESPIRATORY (INHALATION) at 02:43

## 2024-03-13 RX ADMIN — METHYLPREDNISOLONE SODIUM SUCCINATE 40 MG: 40 INJECTION INTRAMUSCULAR; INTRAVENOUS at 05:40

## 2024-03-13 RX ADMIN — METHYLPREDNISOLONE SODIUM SUCCINATE 40 MG: 40 INJECTION INTRAMUSCULAR; INTRAVENOUS at 14:51

## 2024-03-13 RX ADMIN — HYDROMORPHONE HYDROCHLORIDE 0.25 MG: 1 INJECTION, SOLUTION INTRAMUSCULAR; INTRAVENOUS; SUBCUTANEOUS at 05:39

## 2024-03-13 RX ADMIN — HYDROMORPHONE HYDROCHLORIDE 1 MG: 1 INJECTION, SOLUTION INTRAMUSCULAR; INTRAVENOUS; SUBCUTANEOUS at 11:11

## 2024-03-13 RX ADMIN — SODIUM CHLORIDE, PRESERVATIVE FREE 10 ML: 5 INJECTION INTRAVENOUS at 09:20

## 2024-03-13 RX ADMIN — CEFTRIAXONE 1000 MG: 1 INJECTION, POWDER, FOR SOLUTION INTRAMUSCULAR; INTRAVENOUS at 14:51

## 2024-03-13 RX ADMIN — MAGNESIUM SULFATE HEPTAHYDRATE 2000 MG: 40 INJECTION, SOLUTION INTRAVENOUS at 09:07

## 2024-03-13 RX ADMIN — METOPROLOL TARTRATE 2.5 MG: 5 INJECTION INTRAVENOUS at 05:39

## 2024-03-13 RX ADMIN — ALBUTEROL SULFATE 2.5 MG: 2.5 SOLUTION RESPIRATORY (INHALATION) at 09:23

## 2024-03-13 RX ADMIN — ARFORMOTEROL TARTRATE 15 MCG: 15 SOLUTION RESPIRATORY (INHALATION) at 09:23

## 2024-03-13 RX ADMIN — POTASSIUM PHOSPHATE, MONOBASIC POTASSIUM PHOSPHATE, DIBASIC 30 MMOL: 224; 236 INJECTION, SOLUTION, CONCENTRATE INTRAVENOUS at 09:19

## 2024-03-13 ASSESSMENT — PAIN - FUNCTIONAL ASSESSMENT
PAIN_FUNCTIONAL_ASSESSMENT: NONE - DENIES PAIN
PAIN_FUNCTIONAL_ASSESSMENT: PREVENTS OR INTERFERES SOME ACTIVE ACTIVITIES AND ADLS

## 2024-03-13 ASSESSMENT — PAIN SCALES - GENERAL
PAINLEVEL_OUTOF10: 0
PAINLEVEL_OUTOF10: 0
PAINLEVEL_OUTOF10: 5

## 2024-03-13 ASSESSMENT — PAIN DESCRIPTION - PAIN TYPE: TYPE: CHRONIC PAIN

## 2024-03-13 ASSESSMENT — PAIN DESCRIPTION - DESCRIPTORS: DESCRIPTORS: DISCOMFORT

## 2024-03-13 ASSESSMENT — PAIN DESCRIPTION - LOCATION: LOCATION: SHOULDER

## 2024-03-13 ASSESSMENT — PAIN DESCRIPTION - ORIENTATION: ORIENTATION: LEFT

## 2024-03-13 ASSESSMENT — PAIN DESCRIPTION - ONSET: ONSET: PROGRESSIVE

## 2024-03-13 ASSESSMENT — PAIN DESCRIPTION - FREQUENCY: FREQUENCY: CONTINUOUS

## 2024-03-13 NOTE — CARE COORDINATION
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)    Hypercholesterolemia    Diabetes (HCC)    HX: breast cancer    Ductal carcinoma in situ (DCIS) of right breast    Endometrial cancer (HCC)    Gastroesophageal reflux disease    Lung nodules    Malignant pleural effusion    Pain from bone metastases (HCC)    Chondromalacia of left patella    Type 2 diabetes mellitus without complication (HCC)    Pleural effusion, malignant    Sinus tachycardia    Dehydration    Demand ischemia       Service Assessment  Patient Orientation Alert and Oriented, Person, Self   Cognition Alert (Sleepy and lethargic)   History Provided By Child/Family, Spouse, Medical Record   Primary Caregiver Spouse   Accompanied By/Relationship Spouse and son   Support Systems Spouse/Significant Other, Children, Family Members, Other (Comment) ([facility])   Patient's Healthcare Decision Maker is: Legal Next of Kin   PCP Verified by CM Yes (David Hoenicke, MD)   Last Visit to PCP Within last 6 months   Prior Functional Level Assistance with the following:, Mobility, Shopping, Housework, Cooking, Bathing, Dressing   Current Functional Level Assistance with the following:, Mobility, Shopping, Housework, Cooking, Bathing, Dressing   Can patient return to prior living arrangement Yes   Ability to make needs known: Fair   Family able to assist with home care needs: Yes   Would you like for me to discuss the discharge plan with any other family members/significant others, and if so, who? Yes (Spouse)   Financial Resources Medicare,  ([place]) (Primary: [employer].  Secondary: )

## 2024-03-13 NOTE — CARE COORDINATION
Transport with [employer] at 1600 RN, liaison with [facility] and family aware. No further needs.

## 2024-03-13 NOTE — CONSULTS
Palliative Care    Patient: Shadia Maciel MRN: [id number]  SSN: [id number]    YOB: 1955  Age: 69 y.o.  Sex: female       Date of Request: 3/12/2024  Date of Consult:  3/13/2024  Reason for Consult:  goals of care  Requesting Physician: Dr Roque      Assessment/Plan:     Principal Diagnosis:    Fatigue, Lethargy  R53.83    Additional Diagnoses:   Anorexia  R63.0  Cachexia  R64  Debility, Unspecified  R53.81  Frailty  R54  Pain, general  R52  Counseling, Encounter for Medical Advice  Z71.9  Encounter for Palliative Care  Z51.5    Palliative Performance Scale (PPS):       Medical Decision Making:   Reviewed and summarized notes from admission to present   Discussed case with appropriate providers- WILIAM Bell  Reviewed laboratory and x-ray data from admission to present     Pt sleeping, appears comfortable but very frail.   and son at bedside.  Reviewed events, and findings of progression of pt's cancer on CT scan.  They have decided to pursue hospice at home upon discharge.  Pt was seen by a home based PC prior to admission for pain management.  She takes Oxycodone 30 mg q 4 hours PRN. Will provide this while inpatient, and also Dilaudid 1 mg IV q 4 hours PRN.  Discussed with WILIAM Bell.  She will assist with discharge plan.  We will not plan to follow.     Will discuss findings with members of the interdisciplinary team.      Thank you for this referral.          .    Subjective:     History obtained from:  Family, Care Provider, and Chart    Chief Complaint: Dyspnea, metastatic endometrial cancer   History of Present Illness:  Ms Maciel is a 68 yo female with PMH of anxiety, dementia, DM, HTN, HLD, metastatic endometrial cancer, and recurrent malignant pleural effusions, who presented to the ER from home on 3/12/2024 with c/o increasing dyspnea, fatigue, and poor oral intake for several days.  Family denied fevers, n/v/d.  Work up was notable for tachypnea, tachycardia.  CT PE was negative for 
metastatic disease.  4.  Expansile destructive soft tissue lesion in the proximal left humerus with  an underlying pathologic fracture in the surgical neck, compatible with  metastatic disease.  5.  New lytic osseous metastases in the posterior right ribs and midthoracic  vertebral bodies. New pathologic vertebral plana deformity of T7.      Lab/Data Review:  CBC:   Lab Results   Component Value Date/Time    WBC 4.0 03/13/2024 03:40 AM    RBC 3.90 03/13/2024 03:40 AM    HGB 11.4 03/13/2024 03:40 AM    HCT 35.3 03/13/2024 03:40 AM    MCV 90.5 03/13/2024 03:40 AM    MCH 29.2 03/13/2024 03:40 AM    MCHC 32.3 03/13/2024 03:40 AM    RDW 18.3 03/13/2024 03:40 AM     03/13/2024 03:40 AM    MPV 9.0 03/13/2024 03:40 AM     CMP:    Lab Results   Component Value Date/Time     03/13/2024 03:40 AM    K 3.1 03/13/2024 03:40 AM    CL 96 03/13/2024 03:40 AM    CO2 36 03/13/2024 03:40 AM    BUN 6 03/13/2024 03:40 AM    CREATININE 0.30 03/13/2024 03:40 AM    AGRATIO 0.6 03/13/2024 03:40 AM    LABGLOM >60 03/13/2024 03:40 AM    GLUCOSE 165 03/13/2024 03:40 AM    PROT 6.7 03/13/2024 03:40 AM    LABALBU 2.6 03/13/2024 03:40 AM    CALCIUM 9.2 03/13/2024 03:40 AM    BILITOT 0.5 03/13/2024 03:40 AM    ALKPHOS 120 03/13/2024 03:40 AM    AST 39 03/13/2024 03:40 AM    ALT 14 03/13/2024 03:40 AM     Assessment/Plan:   Complex right pleural effusion.  US performed at the bedside, images saved in PACS.  Complex, echogenic right pleural effusion, likely fibrinous (previously had Pleurx catheter).  Based on CT scan, intracostal vessels displaced by tumor growth.  Based on these findings and her history, a thoracentesis is higher risk and unlikely to be successful in relieving her symptoms.      Valerie Vazquez PA-C  Imaging reviewed with BARBY Rivas MD

## 2024-03-13 NOTE — DISCHARGE SUMMARY
Discharge and readmit from [facility] to [facility].  See H&P.  
SARS-CoV-2, the novel coronavirus associated with COVID-19.  A negative result does not rule out COVID-19.     This test has been authorized by the FDA under an Emergency Use Authorization (EUA) for use by authorized laboratories.      Fact sheet for Healthcare Providers: https://www.fda.gov/media/859055/download  Fact sheet for Patients: https://www.fda.gov/media/272078/download     Methodology: Isothermal Nucleic Acid Amplification         Influenza A/B, Molecular [[id number]] Collected: 03/12/24 1239    Order Status: Completed Specimen: Nasopharyngeal Updated: 03/12/24 1327     Influenza A, OPAL Not detected        Comment: Negative results do not preclude infection with influenza virus and should not be the sole basis of a patient treatment decision.        Influenza B, OPAL Not detected       Blood Culture 1 [[id number]] Collected: 03/12/24 1206    Order Status: Completed Specimen: Blood Updated: 03/13/24 0730     Special Requests --        NO SPECIAL REQUESTS  RIGHT  Antecubital       Culture NO GROWTH AFTER 19 HOURS               All Labs from Last 24 Hrs:  Recent Results (from the past 24 hour(s))   Urinalysis w/Reflex to Micro    Collection Time: 03/12/24  2:51 PM   Result Value Ref Range    Color, UA YELLOW/STRAW      Appearance CLEAR      Specific Gravity, UA >1.035 (H) 1.001 - 1.023    pH, Urine 7.0 5.0 - 9.0      Protein, UA TRACE (A) NEG mg/dL    Glucose, UA Negative mg/dL    Ketones, Urine 15 (A) NEG mg/dL    Bilirubin Urine Negative NEG      Blood, Urine TRACE (A) NEG      Urobilinogen, Urine 1.0 0.2 - 1.0 EU/dL    Nitrite, Urine Negative NEG      Leukocyte Esterase, Urine SMALL (A) NEG      WBC, UA 10-20 (A) U4 /hpf    RBC, UA 0-5 U5 /hpf    Epithelial Cells UA 0-5 U5 /hpf    BACTERIA, URINE Negative NEG /hpf    Casts 0-2 U2 /lpf   Troponin    Collection Time: 03/12/24  2:51 PM   Result Value Ref Range    Troponin, High Sensitivity 68.2 (H) 0 - 14 pg/mL   Comprehensive Metabolic Panel w/ Reflex to MG

## 2024-03-15 NOTE — PROGRESS NOTES
Physician Progress Note      PATIENT:               DARIO MENDOZA  CSN #:                  [id number]  :                       1955  ADMIT DATE:       3/12/2024 4:43 PM  DISCH DATE:        3/13/2024 4:40 PM  RESPONDING  PROVIDER #:        Vasyl Feldman MD          QUERY TEXT:    Pt admitted with malignant pleural effusion. Pt noted to have low WBC,   elevated CRP, HR & RR. If possible, please document in the progress notes and   discharge summary if you are evaluating and /or treating any of the following:    The medical record reflects the following:  Risk Factors: 69-year-old female with history of metastatic endometrial cancer   with chronic malignant pleural effusion, chronic respiratory failure (3 L)   who presented to the ER by family with complaints of shortness of breath,   fatigue, and minimal oral intake.  Clinical Indicators: H&P \"Shortness of breath 2/2 acute on chronic malignant   pleural effusion, R/o PNA\"  CXR: There is right airspace disease consistent with atelectasis or pneumonia.  Per MAR: Rocephin: Pneumonia (CAP)  Treatment: CXR, ABX    Thank you,  Jenifer Falcon RN, BSN, CDI  [email or web address]  .  Options provided:  -- Sepsis, present on admission  -- Pneumonia without sepsis  -- Pneumonia was ruled out  -- Other - I will add my own diagnosis  -- Disagree - Not applicable / Not valid  -- Disagree - Clinically unable to determine / Unknown  -- Refer to Clinical Documentation Reviewer    PROVIDER RESPONSE TEXT:    After further study, pneumonia was ruled out for this patient.    Query created by: Jenifer Falcon on 3/14/2024 4:51 PM      Electronically signed by:  Vasyl Feldman MD 3/15/2024 7:20 AM

## 2024-03-17 LAB
BACTERIA SPEC CULT: NORMAL
BACTERIA SPEC CULT: NORMAL
SERVICE CMNT-IMP: NORMAL
SERVICE CMNT-IMP: NORMAL

## 2025-01-30 NOTE — PROGRESS NOTES
Physician Progress Note      PATIENT:               DARIO MENDOZA  CSN #:                  [id number]  :                       1955  ADMIT DATE:       3/12/2024 4:43 PM  DISCH DATE:        3/13/2024 4:40 PM  RESPONDING  PROVIDER #:        Vasyl Feldman MD          QUERY TEXT:    Pt admitted with malignant pleural effusion and has chronic encephalopathy   documented in H&P. If possible, please document in progress notes and   discharge summary further specificity regarding the type of encephalopathy:    The medical record reflects the following:  Risk Factors: 69-year-old female with history of metastatic endometrial cancer   with chronic malignant pleural effusion, chronic respiratory failure (3 L)  Clinical Indicators: Per H&P \"Chronic Encephalopathy. Psych: Confused (the   baseline).  Alert.  Oriented to hospital and event.\"  Per nursing documentation \"Oriented X4; Oriented/disoriented at times.\"  Treatment: N/A    Thank you,  Jenifer Falcon RN, BSN, CDI  [email or web address]  .  Options provided:  -- Encephalopathy, Please specify type.  -- Encephalopathy ruled out  -- Other - I will add my own diagnosis  -- Disagree - Not applicable / Not valid  -- Disagree - Clinically unable to determine / Unknown  -- Refer to Clinical Documentation Reviewer    PROVIDER RESPONSE TEXT:    Encephalopathy ruled out after further study.    Query created by: Jenifer Falcon on 3/15/2024 8:30 AM      Electronically signed by:  Vasyl Feldman MD 3/15/2024 10:40 AM           Unable to assess due to medical condition

## (undated) DEVICE — STERILE POLYISOPRENE POWDER-FREE SURGICAL GLOVES: Brand: PROTEXIS

## (undated) DEVICE — KIT THORCENT 8FR L5IN POLYUR W/ 18/22/25GA NDL 3 W STPCOCK